# Patient Record
Sex: MALE | Race: WHITE
[De-identification: names, ages, dates, MRNs, and addresses within clinical notes are randomized per-mention and may not be internally consistent; named-entity substitution may affect disease eponyms.]

---

## 2018-12-24 ENCOUNTER — HOSPITAL ENCOUNTER (EMERGENCY)
Dept: HOSPITAL 80 - CED | Age: 68
Discharge: LEFT BEFORE DISCHARGE | End: 2018-12-24
Payer: COMMERCIAL

## 2018-12-24 VITALS — SYSTOLIC BLOOD PRESSURE: 150 MMHG | DIASTOLIC BLOOD PRESSURE: 92 MMHG

## 2018-12-24 DIAGNOSIS — N28.9: ICD-10-CM

## 2018-12-24 DIAGNOSIS — I25.2: ICD-10-CM

## 2018-12-24 DIAGNOSIS — I10: ICD-10-CM

## 2018-12-24 DIAGNOSIS — Z95.5: ICD-10-CM

## 2018-12-24 DIAGNOSIS — Z53.29: Primary | ICD-10-CM

## 2018-12-24 DIAGNOSIS — E11.9: ICD-10-CM

## 2018-12-24 DIAGNOSIS — R07.9: ICD-10-CM

## 2018-12-24 DIAGNOSIS — E78.5: ICD-10-CM

## 2018-12-24 NOTE — EDPHY
H & P


Stated Complaint: CP about 90 mins prior to arrival, [place].


Time Seen by Provider: 12/24/18 16:42


HPI/ROS: 








68-year-old male with history of MI x2 with 3 stents, renal insufficiency, 

hypertension, hyperlipidemia, non-insulin-dependent diabetes presents 

complaining of


Chest pain, initially discomfort that lasted approximately an hour followed by 

a 30 sec episode of severe gripping pain associated with a dry heave, no 

radiation of pain.


No shortness of breath.








Review of systems


As per HPI


General no fever no chills no weakness


HEENT no eye pain no eye discharge. No eye redness, no sore throat


Respiratory no cough, no shortness of breath


Cardiac positive chest pain, no peripheral edema


GI no abdominal pain, no diarrhea, no constipation, positive nausea, no vomiting


  no flank pain, no hematuria, no dysuria


Musculoskeletal no myalgias, no joint pain


Heme  no easy bruising, no easy bleeding


Endo no polyuria, no polydipsia


Skin no rashes, no pruritus


Neuro no syncope, no dizziness, no headaches


Psych is no suicidal ideation, no homicidal ideation





Source: Patient, Family


Exam Limitations: No limitations





- Personal History


Current Tetanus/Diphtheria Vaccine: No


Current Tetanus Diphtheria and Acellular Pertussis (TDAP): No


Tetanus Vaccine Date: 2009





- Medical/Surgical History


Hx Asthma: No


Hx Chronic Respiratory Disease: No


Hx Diabetes: Yes


Hx Cardiac Disease: Yes


Hx Renal Disease: No


Hx Cirrhosis: No


Hx Alcoholism: No


Hx HIV/AIDS: No


Hx Splenectomy or Spleen Trauma: No


Other PMH: Bladder/prostate surg 1/2015, MI stent x3, basal cell carcinoma.  BPH

, MI, DM, HTN, hyperlipidemia,





- Family History


Significant Family History: No pertinent family hx





- Social History


Smoking Status: Former smoker


Alcohol Use: None


Drug Use: None





- Physical Exam


Exam: 


68-year-old male alert and oriented no acute distress nontoxic appearance, 

afebrile


HEENT atraumatic normocephalic, extraocular muscles intact, anicteric


Oropharynx negative for erythema negative exudate, tolerating her own secretions


Neck supple no meningismus


Lungs clear to auscultation bilaterally


Heart regular rate and rhythm without murmur rub or gallop


Abdomen nondistended normoactive bowel sounds soft nontender


Back no CVA tenderness, no step-offs, no spinal tenderness


Extremities no cyanosis clubbing or edema


Neuro alert and oriented, no focal deficits


Constitutional: 


 Initial Vital Signs











Temperature (C)  36.6 C   12/24/18 16:45


 


Heart Rate  64   12/24/18 16:45


 


Respiratory Rate  18   12/24/18 16:45


 


Blood Pressure  190/102 H  12/24/18 16:45


 


O2 Sat (%)  97   12/24/18 16:45








 











O2 Delivery Mode               Room Air














Allergies/Adverse Reactions: 


 





tamsulosin HCl [From Flomax] Allergy (Verified 12/24/18 16:44)


 Other-Enter Comments








Home Medications: 














 Medication  Instructions  Recorded


 


Amlodipine Besylate  02/15/15


 


Finasteride  02/15/15


 


Lisinopril  02/15/15


 


Metformin HCl  02/15/15


 


Toprol Xl 100 mg (RX)  02/15/15














Medical Decision Making





- Diagnostics


Imaging Results: 


 Imaging Impressions





Chest X-Ray  12/24/18 16:44


Impression:


1. No active cardiopulmonary disease seen.


2. Progression of DISH mid thoracic spine.











ED Course/Re-evaluation: 





Patient seen and evaluated for chest pain.








EKG normal sinus rhythm, FL interval 233


Qs in inferior leads





given 4 baby aspirin





pt without pain here





iv established and labs sent





CBC wnl


troponin neg


dimer neg


cmp baseline, creatinine 3.2 up from 2.7








CXR no widened mediastinum, negative for pathology





Imp/plan


chest pain, r/o angina, r/o acs





pt refusing to be admitted


explained in detail why I would like him to stay and he decided to leave AMA, 

he signed the AMA form


also explained in detail that even though he is leaving, he is very welcome to 

return at any time


His son was present for this discussion and also understands my concerns.


Differential Diagnosis: 





Differential diagnosis considered but not limited to:


Pneumothorax, pulmonary embolism, myocardial infarction, angina, acute coronary 

syndrome, NSTEMI, STEMI, pneumonia, GERD





- Data Points


Laboratory Results: 


 











  12/24/18 12/24/18





  17:09 17:03


 


POC Sodium    138 mEq/L mEq/L





    (135-145) 


 


POC Potassium    3.5 mEq/L mEq/L





    (3.3-5.0) 


 


POC Chloride    97.0 mEq/L mEq/L





    () 


 


POC Total CO2    24 mEq/L mEq/L





    (22-31) 


 


POC BUN    45 mg/dL H mg/dL





    (7-23) 


 


POC Creatinine    3.2 mg/dL H mg/dL





    (0.7-1.3) 


 


POC Glucose    136 mg/dL H mg/dL





    () 


 


POC Calcium    9.2 mg/dL mg/dL





    (8.5-10.4) 


 


POC Total Bilirubin    0.9 mg/dL mg/dL





    (0.1-1.4) 


 


POC AST    23 IU/L IU/L





    (17-59) 


 


POC ALT    19 IU/L L IU/L





    (21-72) 


 


POC Alk Phosphatase    76 IU/L IU/L





    () 


 


POC Troponin I  0.01 ng/mL ng/mL  





   (0.00-0.08)  


 


POC Total Protein    7.3 g/dL g/dL





    (6.3-8.2) 


 


POC Albumin    3.1 g/dL L g/dL





    (3.5-5.0) 











Medications Given: 


 








Discontinued Medications





Aspirin (Aspirin)  324 mg PO EDNOW ONE


   Stop: 12/24/18 17:44


   Last Admin: 12/24/18 17:55 Dose:  324 mg





Point of Care Test Results: 


 CBC











CBC Collection Date            12/24/18


 


CBC Collection Time            15:00


 


WBC                            8.3


 


RBC                            5.15


 


HGB                            15.1


 


HCT                            42.2


 


PLT                            196


 


Neut #                         6.1


 


Neut                           72.8


 


LYMPH #                        1.7


 


LYMPH                          20.6


 


Other WBC #                    0.5


 


Other WBC                      6.6


 


MCV                            81.9











 Chemistry











  12/24/18 12/24/18





  17:09 17:03


 


POC Sodium    138 mEq/L mEq/L





    (135-145) 


 


POC Potassium    3.5 mEq/L mEq/L





    (3.3-5.0) 


 


POC Chloride    97.0 mEq/L mEq/L





    () 


 


POC Total CO2    24 mEq/L mEq/L





    (22-31) 


 


POC BUN    45 mg/dL H mg/dL





    (7-23) 


 


POC Creatinine    3.2 mg/dL H mg/dL





    (0.7-1.3) 


 


POC Glucose    136 mg/dL H mg/dL





    () 


 


POC Calcium    9.2 mg/dL mg/dL





    (8.5-10.4) 


 


POC Total Bilirubin    0.9 mg/dL mg/dL





    (0.1-1.4) 


 


POC AST    23 IU/L IU/L





    (17-59) 


 


POC ALT    19 IU/L L IU/L





    (21-72) 


 


POC Alk Phosphatase    76 IU/L IU/L





    () 


 


POC Troponin I  0.01 ng/mL ng/mL  





   (0.00-0.08)  


 


POC Total Protein    7.3 g/dL g/dL





    (6.3-8.2) 


 


POC Albumin    3.1 g/dL L g/dL





    (3.5-5.0) 








 D-Dimer











D-Dimer Collection Date        12/24/18


 


D-Dimer Collection Time        17:00


 


D-Dimer (ng/ml)                388

















Departure





- Departure


Disposition: Against Medical Advice


Clinical Impression: 


 Chest pain





Condition: Good


Instructions:  Chest Pain (ED)


Referrals: 


[facility] [Other] - As per Instructions

## 2018-12-24 NOTE — CPEKG
Test Reason : OPEN

Blood Pressure : ***/*** mmHG

Vent. Rate : 064 BPM     Atrial Rate : 065 BPM

   P-R Int : 233 ms          QRS Dur : 105 ms

    QT Int : 439 ms       P-R-T Axes : 032 -36 017 degrees

   QTc Int : 453 ms

 

Sinus rhythm

Prolonged IN interval

Inferior infarct, old

 

Confirmed by Gladys Calvillo ([id number]) on 12/24/2018 10:52:35 PM

 

Referred By:             Confirmed By:Gladys Calvillo